# Patient Record
Sex: FEMALE | Race: WHITE | Employment: PART TIME | URBAN - METROPOLITAN AREA
[De-identification: names, ages, dates, MRNs, and addresses within clinical notes are randomized per-mention and may not be internally consistent; named-entity substitution may affect disease eponyms.]

---

## 2018-09-14 ENCOUNTER — APPOINTMENT (OUTPATIENT)
Dept: GENERAL RADIOLOGY | Age: 38
End: 2018-09-14
Payer: COMMERCIAL

## 2018-09-14 ENCOUNTER — HOSPITAL ENCOUNTER (EMERGENCY)
Age: 38
Discharge: HOME | End: 2018-09-14
Payer: COMMERCIAL

## 2018-09-14 VITALS
SYSTOLIC BLOOD PRESSURE: 144 MMHG | TEMPERATURE: 97.4 F | BODY MASS INDEX: 51.91 KG/M2 | OXYGEN SATURATION: 97 % | HEIGHT: 63 IN | DIASTOLIC BLOOD PRESSURE: 90 MMHG | HEART RATE: 94 BPM | RESPIRATION RATE: 16 BRPM | WEIGHT: 293 LBS

## 2018-09-14 DIAGNOSIS — T18.9XXA FOREIGN BODY, SWALLOWED, INITIAL ENCOUNTER: Primary | ICD-10-CM

## 2018-09-14 LAB — PREGNANCY TEST URINE, POC: NEGATIVE

## 2018-09-14 PROCEDURE — 99283 EMERGENCY DEPT VISIT LOW MDM: CPT

## 2018-09-14 PROCEDURE — 74022 RADEX COMPL AQT ABD SERIES: CPT

## 2018-09-20 NOTE — ED PROVIDER NOTES
[facility]     Department of Emergency Medicine   ED  Provider Note  Admit Date/RoomTime: 2018  2:52 PM  ED Room: /   Chief Complaint   Dental Problem (patient swallowed her tooth, called dental clinic and they said she had to come here for xray before they will see her)    History of Present Illness   Source of history provided by:  patient. History/Exam Limitations: none. Stacia Williamson is a 40 y.o. old female who has a past medical history of:   Past Medical History:   Diagnosis Date    Asthma     presents to the emergency department by private vehicle, Broke off a part of her tooth, thinks she swallowed it. She states she went to the dental clinic and they told her that they would not see her unless she had an x-ray to verify where the swallowed foreign body went. She denies any difficulty breathing, swallowing, abdominal pain or vomiting. ROS    Pertinent positives and negatives are stated within HPI, all other systems reviewed and are negative. .    Past Surgical History:  has a past surgical history that includes  section. Social History:  reports that she has never smoked. She has never used smokeless tobacco. She reports that she does not drink alcohol or use drugs. Family History: family history is not on file. Allergies: Latex    Physical Exam           ED Triage Vitals [18 1448]   BP Temp Temp Source Pulse Resp SpO2 Height Weight   (!) 144/90 97.4 °F (36.3 °C) Temporal 94 16 97 % 5' 3\" (1.6 m) (!) 320 lb (145.2 kg)      Oxygen Saturation Interpretation: Normal.    · Constitutional:  Alert, development consistent with age. · HEENT:  NC/NT. Airway patent. · Neck:  Supple. Normal ROM. · Lips:  upper and lower normal.  · Mouth:  normal tongue and buccal mucosa. · Dental:  Avulsion to distal tip of tooth # 8. Trismus: No.         Drooling: No.           Airway stridor: No.  · Facial skin: bilateral no erythema, rash or swelling noted. · Respiratory:  Clear to auscultation and breath sounds equal.    · CV: Regular rate and rhythm, normal heart sounds, without pathological murmurs, ectopy, gallops, or rubs. · Skin:  No rashes, erythema or lesions present, unless noted elsewhere. .  · Lymphatics: No lymphangitis or adenopathy noted. · Neurological:  Oriented. Motor functions intact. Lab / Imaging Results   (All laboratory and radiology results have been personally reviewed by myself)  Labs:  Results for orders placed or performed during the hospital encounter of 09/14/18   POC Pregnancy Urine Qual   Result Value Ref Range    Preg Test, Ur negative      Imaging: All Radiology results interpreted by Radiologist unless otherwise noted. XR Acute Abd Series Chest 1 VW   Final Result      1. No acute cardiopulmonary pathology. 2. No acute intra-abdominal pathology. 3. No identifiable radiopaque foreign body. ED Course / Medical Decision Making   Medications - No data to display     Consult(s):   Dental Resident was not consulted to see patient regarding complaint. Procedure(s):    none. Counseling/MDM:   Xray unremarkable. Patient to follow-up with dental clinic. The emergency provider has spoken with the patient and discussed todays results, in addition to providing specific details for the plan of care and counseling regarding the diagnosis and prognosis. Questions are answered at this time and they are agreeable with the plan. Assessment      1. History of swallowed foreign body      Plan   Discharge to home  Patient condition is good    New Medications     Discharge Medication List as of 9/14/2018  3:58 PM        Electronically signed by HARRY Ricks CNP   DD: 9/20/18  **This report was transcribed using voice recognition software. Every effort was made to ensure accuracy; however, inadvertent computerized transcription errors may be present.   END OF ED PROVIDER NOTE      HARRY Chris -

## 2018-11-05 ENCOUNTER — HOSPITAL ENCOUNTER (EMERGENCY)
Age: 38
Discharge: HOME | End: 2018-11-05
Payer: COMMERCIAL

## 2018-11-05 VITALS
OXYGEN SATURATION: 97 % | RESPIRATION RATE: 16 BRPM | TEMPERATURE: 96.9 F | WEIGHT: 293 LBS | HEART RATE: 84 BPM | BODY MASS INDEX: 51.91 KG/M2 | HEIGHT: 63 IN

## 2018-11-05 DIAGNOSIS — S61.210A LACERATION OF RIGHT INDEX FINGER WITHOUT FOREIGN BODY WITHOUT DAMAGE TO NAIL, INITIAL ENCOUNTER: Primary | ICD-10-CM

## 2018-11-05 PROCEDURE — 6370000000 HC RX 637 (ALT 250 FOR IP): Performed by: NURSE PRACTITIONER

## 2018-11-05 PROCEDURE — 90715 TDAP VACCINE 7 YRS/> IM: CPT | Performed by: NURSE PRACTITIONER

## 2018-11-05 PROCEDURE — 99282 EMERGENCY DEPT VISIT SF MDM: CPT

## 2018-11-05 PROCEDURE — 90471 IMMUNIZATION ADMIN: CPT | Performed by: NURSE PRACTITIONER

## 2018-11-05 PROCEDURE — 6360000002 HC RX W HCPCS: Performed by: NURSE PRACTITIONER

## 2018-11-05 PROCEDURE — 12002 RPR S/N/AX/GEN/TRNK2.6-7.5CM: CPT

## 2018-11-05 PROCEDURE — 2500000003 HC RX 250 WO HCPCS: Performed by: NURSE PRACTITIONER

## 2018-11-05 RX ORDER — CEPHALEXIN 500 MG/1
500 CAPSULE ORAL 4 TIMES DAILY
Qty: 40 CAPSULE | Refills: 0 | Status: SHIPPED | OUTPATIENT
Start: 2018-11-05

## 2018-11-05 RX ORDER — LIDOCAINE HYDROCHLORIDE 10 MG/ML
5 INJECTION, SOLUTION INFILTRATION; PERINEURAL ONCE
Status: COMPLETED | OUTPATIENT
Start: 2018-11-05 | End: 2018-11-05

## 2018-11-05 RX ORDER — DIAPER,BRIEF,INFANT-TODD,DISP
EACH MISCELLANEOUS ONCE
Status: COMPLETED | OUTPATIENT
Start: 2018-11-05 | End: 2018-11-05

## 2018-11-05 RX ADMIN — TETANUS TOXOID, REDUCED DIPHTHERIA TOXOID AND ACELLULAR PERTUSSIS VACCINE, ADSORBED 0.5 ML: 5; 2.5; 8; 8; 2.5 SUSPENSION INTRAMUSCULAR at 20:24

## 2018-11-05 RX ADMIN — BACITRACIN ZINC: 500 OINTMENT TOPICAL at 20:24

## 2018-11-05 RX ADMIN — LIDOCAINE HYDROCHLORIDE 5 ML: 10 INJECTION, SOLUTION INFILTRATION; PERINEURAL at 20:23

## 2018-11-05 ASSESSMENT — PAIN SCALES - GENERAL
PAINLEVEL_OUTOF10: 10
PAINLEVEL_OUTOF10: 3

## 2018-11-05 ASSESSMENT — PAIN DESCRIPTION - LOCATION: LOCATION: FINGER (COMMENT WHICH ONE)

## 2018-11-05 ASSESSMENT — PAIN DESCRIPTION - PAIN TYPE: TYPE: ACUTE PAIN

## 2018-11-05 ASSESSMENT — PAIN DESCRIPTION - ORIENTATION: ORIENTATION: LEFT

## 2018-11-06 NOTE — ED PROVIDER NOTES
11/05/18. RADIOLOGY:  Interpreted by Radiologist.  No orders to display       ------------------------- NURSING NOTES AND VITALS REVIEWED ---------------------------   The nursing notes within the ED encounter and vital signs as below have been reviewed. Pulse 84   Temp 96.9 °F (36.1 °C) (Temporal)   Resp 16   Ht 5' 3\" (1.6 m)   Wt (!) 320 lb (145.2 kg)   SpO2 97%   BMI 56.69 kg/m²   Oxygen Saturation Interpretation: Normal      ---------------------------------------------------PHYSICAL EXAM--------------------------------------      Constitutional/General: Alert and oriented x3, well appearing, non toxic in NAD  Head: Normocephalic and atraumatic  Eyes: PERRL, EOMI  Mouth: Oropharynx clear, handling secretions, no trismus  Neck: Supple, full ROM,   Pulmonary: Lungs clear to auscultation bilaterally, no wheezes, rales, or rhonchi. Not in respiratory distress  Cardiovascular:  Regular rate and rhythm, no murmurs, gallops, or rubs. 2+ distal pulses  Abdomen: Soft, non tender, non distended,   Extremities: Moves all extremities x 4. Warm and well perfused  Skin: warm and dry without rash. There is a laceration of the right hand second and third metacarpal above the PIP joints, which measures 3 cm. It is a Partial  thickness laceration. There is no  evidence of foreign body or gross contamination. Neurologic: GCS 15,  Psych: Normal Affect      ------------------------------ ED COURSE/MEDICAL DECISION MAKING----------------------  Medications   lidocaine 1 % injection 5 mL (5 mLs Intradermal Given 11/5/18 2023)   bacitracin zinc ointment ( Topical Given 11/5/18 2024)   Tetanus-Diphth-Acell Pertussis (BOOSTRIX) injection 0.5 mL (0.5 mLs Intramuscular Given 11/5/18 2024)             LACERATION REPAIR  PROCEDURE NOTE:  Unless otherwise indicated, this procedure was performed by Karmen Oneal CNP       Laceration #: 1.   Location: Right hand index finger and right hand middle finger above PIP joint  Length: 3cm.  The wound area was irrigated with sterile saline, cleansend with shur-clens and draped in a sterile fashion. The wound area was anesthetized with Lidocaine 1% without epinephrine. WOUND COMPLEXITY:    Debridement: partial thickness and None. Undermining: partial thickness and None. Wound Margins Revised: yes. Flaps Aligned: yes. The wound was explored with the following results No foreign bodies found, no foreign body or tendon injury seen. The wound was closed with 4-0 Prolene using interrupted sutures. Dressing:  bacitracin and a sterile dressing was placed. Total number suture: 5 sutures to each finger -    10 total sutures      Medical Decision Making:     Plan will be for laceration repair,  Patient presents to the emergency Department with lacerations to right hand second and third metacarpal vertically above PIP joint region. Patient reports that she was cooking food and cutting food up with a knife when she slid across both his fingers. Noted to be to the dorsum aspect. Patient neurovascular intact. She is able to completely flex and extend all digits to the right hand. She is unaware when her last tetanus immunization was. Suture repair was completed, 5 sutures placed to each metacarpal.  Wound care also provided. Patient educated on signs and symptoms of infection, suture removal as well as impetigo daily wound care. Patient expresses understanding and safely discharged home with close follow-up by her primary care physician. Counseling: The emergency provider has spoken with the patient and discussed todays results, in addition to providing specific details for the plan of care and counseling regarding the diagnosis and prognosis. Questions are answered at this time and they are agreeable with the plan.      --------------------------------- IMPRESSION AND DISPOSITION ---------------------------------    IMPRESSION  1.  Laceration of right index finger without foreign body

## 2021-02-19 ENCOUNTER — HOSPITAL ENCOUNTER (EMERGENCY)
Age: 41
Discharge: HOME | End: 2021-02-19
Payer: COMMERCIAL

## 2021-02-19 VITALS
DIASTOLIC BLOOD PRESSURE: 84 MMHG | HEIGHT: 64 IN | OXYGEN SATURATION: 96 % | WEIGHT: 293 LBS | BODY MASS INDEX: 50.02 KG/M2 | RESPIRATION RATE: 15 BRPM | SYSTOLIC BLOOD PRESSURE: 132 MMHG | TEMPERATURE: 97.7 F | HEART RATE: 88 BPM

## 2021-02-19 DIAGNOSIS — S61.211A LACERATION OF LEFT INDEX FINGER WITHOUT FOREIGN BODY WITHOUT DAMAGE TO NAIL, INITIAL ENCOUNTER: Primary | ICD-10-CM

## 2021-02-19 PROCEDURE — 12001 RPR S/N/AX/GEN/TRNK 2.5CM/<: CPT

## 2021-02-19 PROCEDURE — 99283 EMERGENCY DEPT VISIT LOW MDM: CPT

## 2021-02-19 PROCEDURE — 2500000003 HC RX 250 WO HCPCS: Performed by: NURSE PRACTITIONER

## 2021-02-19 PROCEDURE — 6370000000 HC RX 637 (ALT 250 FOR IP): Performed by: NURSE PRACTITIONER

## 2021-02-19 RX ORDER — HYDROCODONE BITARTRATE AND ACETAMINOPHEN 5; 325 MG/1; MG/1
1 TABLET ORAL ONCE
Status: COMPLETED | OUTPATIENT
Start: 2021-02-19 | End: 2021-02-19

## 2021-02-19 RX ORDER — LIDOCAINE HYDROCHLORIDE 10 MG/ML
5 INJECTION, SOLUTION INFILTRATION; PERINEURAL ONCE
Status: COMPLETED | OUTPATIENT
Start: 2021-02-19 | End: 2021-02-19

## 2021-02-19 RX ORDER — DIAPER,BRIEF,INFANT-TODD,DISP
EACH MISCELLANEOUS ONCE
Status: COMPLETED | OUTPATIENT
Start: 2021-02-19 | End: 2021-02-19

## 2021-02-19 RX ADMIN — LIDOCAINE HYDROCHLORIDE 5 ML: 10 INJECTION, SOLUTION INFILTRATION; PERINEURAL at 17:45

## 2021-02-19 RX ADMIN — HYDROCODONE BITARTRATE AND ACETAMINOPHEN 1 TABLET: 5; 325 TABLET ORAL at 17:45

## 2021-02-19 RX ADMIN — BACITRACIN ZINC 1 EACH: 500 OINTMENT TOPICAL at 18:20

## 2021-02-19 ASSESSMENT — PAIN DESCRIPTION - FREQUENCY: FREQUENCY: CONTINUOUS

## 2021-02-20 NOTE — ED PROVIDER NOTES
Swelling: None. Deformity: no.             ROM: full range with pain. Full action and extension of all digits of left hand at the MCP, PIP, and DIP joints. Skin: 2 cm V-shaped laceration. Neurovascular: Motor deficit: none. Sensory deficit: none. Pulse deficit: none. Capillary refill: normal.  Hand:              Tenderness:  none. Swelling: None. Deformity: no.             Skin:  normal exam; no wounds, erythema, or swelling. No neurovascular deficits. No motor or sensory deficits. Compartments soft and compressible. Lymphatics: No lymphangitis or adenopathy noted. Neurological:  Oriented. Motor functions intact. Lab / Imaging Results   (All laboratory and radiology results have been personally reviewed by myself)  Labs:  No results found for this visit on 02/19/21. Imaging: All Radiology results interpreted by Radiologist unless otherwise noted. No orders to display     ED Course / Medical Decision Making     Medications   lidocaine 1 % injection 5 mL (5 mLs Intradermal Given 2/19/21 1745)   HYDROcodone-acetaminophen (NORCO) 5-325 MG per tablet 1 tablet (1 tablet Oral Given 2/19/21 1745)   bacitracin zinc ointment (1 each Topical Given 2/19/21 1820)        Consult(s):   None    Procedure(s):   PROCEDURE NOTE  2/19/21        LACERATION REPAIR  Risks, benefits and alternatives (for applicable procedures below) described. Performed By: HARRY Colbert CNP. Laceration #: 1. Location: left index finger  Length: 2 cm. \"V\" shaped  The wound area was irrigated with sterile saline, cleansend with shur-clens and draped in a sterile fashion. Local Anesthesia:  Lidocaine 1% without epinephrine. The wound was explored with the following results:  no foreign body or tendon injury seen. Debridement: None. Undermining: None. Wound Margins Revised: None. Flaps Aligned: no.   The wound was closed with 4-0 Ethilon using interrupted sutures. Dressing:  bacitracin, a sterile dressing and protective splint. There were no additional wounds requiring formal closure. Total number suture:  4. MDM:   Fior Stewart is a 78-year-old female who presented to the emergency department complaint of laceration to left index finger. She reported cutting it on a kitchen knife while cutting food just prior to her arrival.  Her tetanus was up-to-date. No blood thinners. There was no concern for possibility of foreign body. She had full flexion and extension of all digits at the MCP, DIP, and PIP joints. She sustained a 2 cm V-shaped laceration to left index finger at the DIP joint. And was repaired as stated above. Wound care provided and wound care instructions discussed. She verbalized understanding. She was instructed to follow-up with her PCP for wound check and suture removal.  She remained hemodynamically stable, nontoxic, and appropriate for patient management. Advised to return for any new, change, worsening symptoms or concerns. At this time the patient is without objective evidence of an acute process requiring hospitalization or inpatient management. They have remained hemodynamically stable throughout their entire ED visit and are stable for discharge with outpatient follow-up. The plan has been discussed in detail and they are aware of the specific conditions for emergent return, as well as the importance of follow-up. Plan of Care/Counseling:  I reviewed today's visit with the patient in addition to providing specific details for the plan of care and counseling regarding the diagnosis and prognosis. Questions are answered at this time and are agreeable with the plan. Assessment     1. Laceration of left index finger without foreign body without damage to nail, initial encounter      Plan   Discharge home.   Patient condition is good    New Medications     Discharge Medication List as of 2/19/2021  6:26 PM        Electronically signed by HARRY Chavez CNP   DD: 2/19/21  **This report was transcribed using voice recognition software. Every effort was made to ensure accuracy; however, inadvertent computerized transcription errors may be present.   END OF ED PROVIDER NOTE      HARRY Chavez CNP  02/19/21 2018

## 2021-12-21 ENCOUNTER — HOSPITAL ENCOUNTER (EMERGENCY)
Age: 41
Discharge: HOME | End: 2021-12-21
Payer: COMMERCIAL

## 2021-12-21 ENCOUNTER — APPOINTMENT (OUTPATIENT)
Dept: GENERAL RADIOLOGY | Age: 41
End: 2021-12-21
Payer: COMMERCIAL

## 2021-12-21 VITALS
TEMPERATURE: 98 F | RESPIRATION RATE: 18 BRPM | SYSTOLIC BLOOD PRESSURE: 152 MMHG | BODY MASS INDEX: 54.93 KG/M2 | WEIGHT: 293 LBS | OXYGEN SATURATION: 98 % | DIASTOLIC BLOOD PRESSURE: 106 MMHG | HEART RATE: 88 BPM

## 2021-12-21 DIAGNOSIS — S39.012A STRAIN OF LUMBAR REGION, INITIAL ENCOUNTER: Primary | ICD-10-CM

## 2021-12-21 PROCEDURE — 6370000000 HC RX 637 (ALT 250 FOR IP): Performed by: PHYSICIAN ASSISTANT

## 2021-12-21 PROCEDURE — 72100 X-RAY EXAM L-S SPINE 2/3 VWS: CPT

## 2021-12-21 PROCEDURE — 99283 EMERGENCY DEPT VISIT LOW MDM: CPT

## 2021-12-21 RX ORDER — LIDOCAINE 50 MG/G
1 PATCH TOPICAL DAILY
Qty: 10 PATCH | Refills: 0 | Status: SHIPPED | OUTPATIENT
Start: 2021-12-21 | End: 2021-12-31

## 2021-12-21 RX ORDER — METHOCARBAMOL 500 MG/1
500 TABLET, FILM COATED ORAL 4 TIMES DAILY PRN
Qty: 30 TABLET | Refills: 0 | Status: SHIPPED | OUTPATIENT
Start: 2021-12-21 | End: 2021-12-31

## 2021-12-21 RX ORDER — NAPROXEN 500 MG/1
500 TABLET ORAL 2 TIMES DAILY WITH MEALS
Qty: 20 TABLET | Refills: 0 | Status: SHIPPED | OUTPATIENT
Start: 2021-12-21

## 2021-12-21 RX ORDER — HYDROCODONE BITARTRATE AND ACETAMINOPHEN 5; 325 MG/1; MG/1
1 TABLET ORAL ONCE
Status: COMPLETED | OUTPATIENT
Start: 2021-12-21 | End: 2021-12-21

## 2021-12-21 RX ADMIN — HYDROCODONE BITARTRATE AND ACETAMINOPHEN 1 TABLET: 5; 325 TABLET ORAL at 10:19

## 2021-12-21 ASSESSMENT — PAIN SCALES - GENERAL
PAINLEVEL_OUTOF10: 10
PAINLEVEL_OUTOF10: 10

## 2021-12-21 NOTE — ED PROVIDER NOTES
[facility]      Department of Emergency Medicine   ED  Provider Note  Admit Date/RoomTime: 2021  8:56 AM  ED Room: RADHA/RADHA          8:58 AM EST      HPI: Dimas Lopez 41 y. o.female with   Past Medical History:   Diagnosis Date    Asthma     who presents with bilateral sided lower back pain. The patient states that the back pain began 2-3 days. The history is obtained from the patient. The mechanism of the injury is apparent. The patient states that the pain is moderate. It is worsened by movement including flexion and extension of the back as well as rotation. Patient denies any distal neurovascular complaints including numbness tingling or weakness. There are no bowel or bladder symptoms reported. No incontinence and no urinary retention. The patient denies saddle or groin anesthesia. The patient also denies fevers, chills, weight loss, night sweats, IV drug use, or recent illness. Review of Systems:   Pertinent positives and negatives are stated within HPI, all other systems reviewed and are negative.      --------------------------------------------- PAST HISTORY ---------------------------------------------  Past Medical History:  has a past medical history of Asthma. Past Surgical History:  has a past surgical history that includes  section. Social History:  reports that she has never smoked. She has never used smokeless tobacco. She reports that she does not drink alcohol and does not use drugs. Family History: family history is not on file. The patients home medications have been reviewed. Allergies: Latex             Nursing Notes Reviewed by myself  Vitals Signs Reviewed by myself  BP (!) 152/106   Pulse 88   Temp 98 °F (36.7 °C)   Resp 18   Wt (!) 320 lb (145.2 kg)   LMP 2021   SpO2 98%   BMI 54.93 kg/m²         Physical exam:  Constitutional:  The patient is comfortable, well appearing, non toxic in NAD. Patient is alert and oriented x3.   Head: Head is atraumatic and normocephalic. Eyes: There is no discharge from the eyes. Sclerae are normal.  ENT: The oropharynx is normal. The mouth is normal to inspection. Neck: Normal range of motion of the neck is present there is no JVD present no meningeal signs are present. Respiratory/chest: The chest is nontender breath sounds are normal  Cardiovascular: Regular rate and rhythm is noted. No murmurs. No rubs or gallops. Skin: Skin is warm and dry, Skin exam normal  Neurologic exam: The patient's Valarie Coma Scale is 15. No focal motor deficits. There are no focal sensory deficits. Deep tendon reflexes are intact and present bilaterally in the lower extremities. Babinski is absent. Gait is normal. Symmetric strength and sensation in the lower extremities bilaterally. Back exam: The patient has reproducible tenderness to palpation in the paravertebral lumbar region bilaterally. There is no evidence of localized erythema nor is there any focal warmth to the back. The patient has no evidence of single vertebral tenderness or any single area of interspace tenderness. There are no palpable deformities, lacerations, abrasions, or stepoffs. No midline cervical, thoracic, or lumbar spine tenderness. Medical decision making:   Mechanical lumbosacral strain without evidence of fracture or neurologic compromise. ED Course as of 12/21/21 1424   Tue Dec 21, 2021   [id number] Awaiting XR for patient.   [MS]      ED Course User Index  [MS] Gabe Patient, [id number]       Plan:  Review of past medical records for appropriate pain control and outpatient referral.               Impression:  Lumbosacral Strain    Disposition:  Discharge    Condition:  Stable        Gabe Patient, PA  12/21/21 [id number]